# Patient Record
Sex: FEMALE | Race: WHITE | NOT HISPANIC OR LATINO
[De-identification: names, ages, dates, MRNs, and addresses within clinical notes are randomized per-mention and may not be internally consistent; named-entity substitution may affect disease eponyms.]

---

## 2023-11-10 ENCOUNTER — NON-APPOINTMENT (OUTPATIENT)
Age: 18
End: 2023-11-10

## 2023-12-20 ENCOUNTER — EMERGENCY (EMERGENCY)
Facility: HOSPITAL | Age: 18
LOS: 0 days | Discharge: HOME | End: 2023-12-20
Attending: EMERGENCY MEDICINE
Payer: COMMERCIAL

## 2023-12-20 VITALS
RESPIRATION RATE: 18 BRPM | TEMPERATURE: 98 F | HEART RATE: 105 BPM | DIASTOLIC BLOOD PRESSURE: 70 MMHG | OXYGEN SATURATION: 100 % | SYSTOLIC BLOOD PRESSURE: 135 MMHG | HEIGHT: 63 IN | WEIGHT: 115.96 LBS

## 2023-12-20 VITALS
HEART RATE: 89 BPM | OXYGEN SATURATION: 99 % | SYSTOLIC BLOOD PRESSURE: 114 MMHG | TEMPERATURE: 98 F | DIASTOLIC BLOOD PRESSURE: 70 MMHG | RESPIRATION RATE: 16 BRPM

## 2023-12-20 DIAGNOSIS — B34.9 VIRAL INFECTION, UNSPECIFIED: ICD-10-CM

## 2023-12-20 DIAGNOSIS — R00.0 TACHYCARDIA, UNSPECIFIED: ICD-10-CM

## 2023-12-20 DIAGNOSIS — R53.83 OTHER FATIGUE: ICD-10-CM

## 2023-12-20 DIAGNOSIS — R53.1 WEAKNESS: ICD-10-CM

## 2023-12-20 DIAGNOSIS — E86.0 DEHYDRATION: ICD-10-CM

## 2023-12-20 DIAGNOSIS — R52 PAIN, UNSPECIFIED: ICD-10-CM

## 2023-12-20 DIAGNOSIS — Z20.822 CONTACT WITH AND (SUSPECTED) EXPOSURE TO COVID-19: ICD-10-CM

## 2023-12-20 LAB
ALBUMIN SERPL ELPH-MCNC: 3.4 G/DL — SIGNIFICANT CHANGE UP (ref 3.3–5)
ALBUMIN SERPL ELPH-MCNC: 3.4 G/DL — SIGNIFICANT CHANGE UP (ref 3.3–5)
ALP SERPL-CCNC: 45 U/L — SIGNIFICANT CHANGE UP (ref 40–120)
ALP SERPL-CCNC: 45 U/L — SIGNIFICANT CHANGE UP (ref 40–120)
ALT FLD-CCNC: 15 U/L — SIGNIFICANT CHANGE UP (ref 12–78)
ALT FLD-CCNC: 15 U/L — SIGNIFICANT CHANGE UP (ref 12–78)
ANION GAP SERPL CALC-SCNC: 4 MMOL/L — LOW (ref 5–17)
ANION GAP SERPL CALC-SCNC: 4 MMOL/L — LOW (ref 5–17)
APPEARANCE UR: CLEAR — SIGNIFICANT CHANGE UP
APPEARANCE UR: CLEAR — SIGNIFICANT CHANGE UP
AST SERPL-CCNC: 11 U/L — LOW (ref 15–37)
AST SERPL-CCNC: 11 U/L — LOW (ref 15–37)
BASOPHILS # BLD AUTO: 0.08 K/UL — SIGNIFICANT CHANGE UP (ref 0–0.2)
BASOPHILS # BLD AUTO: 0.08 K/UL — SIGNIFICANT CHANGE UP (ref 0–0.2)
BASOPHILS NFR BLD AUTO: 1 % — SIGNIFICANT CHANGE UP (ref 0–2)
BASOPHILS NFR BLD AUTO: 1 % — SIGNIFICANT CHANGE UP (ref 0–2)
BILIRUB SERPL-MCNC: 0.6 MG/DL — SIGNIFICANT CHANGE UP (ref 0.2–1.2)
BILIRUB SERPL-MCNC: 0.6 MG/DL — SIGNIFICANT CHANGE UP (ref 0.2–1.2)
BILIRUB UR-MCNC: NEGATIVE — SIGNIFICANT CHANGE UP
BILIRUB UR-MCNC: NEGATIVE — SIGNIFICANT CHANGE UP
BUN SERPL-MCNC: 6 MG/DL — LOW (ref 7–23)
BUN SERPL-MCNC: 6 MG/DL — LOW (ref 7–23)
CALCIUM SERPL-MCNC: 8.6 MG/DL — SIGNIFICANT CHANGE UP (ref 8.5–10.1)
CALCIUM SERPL-MCNC: 8.6 MG/DL — SIGNIFICANT CHANGE UP (ref 8.5–10.1)
CHLORIDE SERPL-SCNC: 109 MMOL/L — HIGH (ref 96–108)
CHLORIDE SERPL-SCNC: 109 MMOL/L — HIGH (ref 96–108)
CK SERPL-CCNC: 25 U/L — LOW (ref 26–192)
CK SERPL-CCNC: 25 U/L — LOW (ref 26–192)
CO2 SERPL-SCNC: 28 MMOL/L — SIGNIFICANT CHANGE UP (ref 22–31)
CO2 SERPL-SCNC: 28 MMOL/L — SIGNIFICANT CHANGE UP (ref 22–31)
COLOR SPEC: YELLOW — SIGNIFICANT CHANGE UP
COLOR SPEC: YELLOW — SIGNIFICANT CHANGE UP
CREAT SERPL-MCNC: 0.64 MG/DL — SIGNIFICANT CHANGE UP (ref 0.5–1.3)
CREAT SERPL-MCNC: 0.64 MG/DL — SIGNIFICANT CHANGE UP (ref 0.5–1.3)
DIFF PNL FLD: NEGATIVE — SIGNIFICANT CHANGE UP
DIFF PNL FLD: NEGATIVE — SIGNIFICANT CHANGE UP
EGFR: 130 ML/MIN/1.73M2 — SIGNIFICANT CHANGE UP
EGFR: 130 ML/MIN/1.73M2 — SIGNIFICANT CHANGE UP
EOSINOPHIL # BLD AUTO: 0.26 K/UL — SIGNIFICANT CHANGE UP (ref 0–0.5)
EOSINOPHIL # BLD AUTO: 0.26 K/UL — SIGNIFICANT CHANGE UP (ref 0–0.5)
EOSINOPHIL NFR BLD AUTO: 3.2 % — SIGNIFICANT CHANGE UP (ref 0–6)
EOSINOPHIL NFR BLD AUTO: 3.2 % — SIGNIFICANT CHANGE UP (ref 0–6)
FLUAV AG NPH QL: SIGNIFICANT CHANGE UP
FLUAV AG NPH QL: SIGNIFICANT CHANGE UP
FLUBV AG NPH QL: SIGNIFICANT CHANGE UP
FLUBV AG NPH QL: SIGNIFICANT CHANGE UP
GLUCOSE SERPL-MCNC: 96 MG/DL — SIGNIFICANT CHANGE UP (ref 70–99)
GLUCOSE SERPL-MCNC: 96 MG/DL — SIGNIFICANT CHANGE UP (ref 70–99)
GLUCOSE UR QL: NEGATIVE MG/DL — SIGNIFICANT CHANGE UP
GLUCOSE UR QL: NEGATIVE MG/DL — SIGNIFICANT CHANGE UP
HCG SERPL-ACNC: <1 MIU/ML — SIGNIFICANT CHANGE UP
HCG SERPL-ACNC: <1 MIU/ML — SIGNIFICANT CHANGE UP
HCT VFR BLD CALC: 38.2 % — SIGNIFICANT CHANGE UP (ref 34.5–45)
HCT VFR BLD CALC: 38.2 % — SIGNIFICANT CHANGE UP (ref 34.5–45)
HGB BLD-MCNC: 12.8 G/DL — SIGNIFICANT CHANGE UP (ref 11.5–15.5)
HGB BLD-MCNC: 12.8 G/DL — SIGNIFICANT CHANGE UP (ref 11.5–15.5)
IMM GRANULOCYTES NFR BLD AUTO: 0.2 % — SIGNIFICANT CHANGE UP (ref 0–0.9)
IMM GRANULOCYTES NFR BLD AUTO: 0.2 % — SIGNIFICANT CHANGE UP (ref 0–0.9)
KETONES UR-MCNC: NEGATIVE MG/DL — SIGNIFICANT CHANGE UP
KETONES UR-MCNC: NEGATIVE MG/DL — SIGNIFICANT CHANGE UP
LEUKOCYTE ESTERASE UR-ACNC: NEGATIVE — SIGNIFICANT CHANGE UP
LEUKOCYTE ESTERASE UR-ACNC: NEGATIVE — SIGNIFICANT CHANGE UP
LYMPHOCYTES # BLD AUTO: 0.79 K/UL — LOW (ref 1–3.3)
LYMPHOCYTES # BLD AUTO: 0.79 K/UL — LOW (ref 1–3.3)
LYMPHOCYTES # BLD AUTO: 9.8 % — LOW (ref 13–44)
LYMPHOCYTES # BLD AUTO: 9.8 % — LOW (ref 13–44)
MAGNESIUM SERPL-MCNC: 2 MG/DL — SIGNIFICANT CHANGE UP (ref 1.6–2.6)
MAGNESIUM SERPL-MCNC: 2 MG/DL — SIGNIFICANT CHANGE UP (ref 1.6–2.6)
MCHC RBC-ENTMCNC: 30.7 PG — SIGNIFICANT CHANGE UP (ref 27–34)
MCHC RBC-ENTMCNC: 30.7 PG — SIGNIFICANT CHANGE UP (ref 27–34)
MCHC RBC-ENTMCNC: 33.5 GM/DL — SIGNIFICANT CHANGE UP (ref 32–36)
MCHC RBC-ENTMCNC: 33.5 GM/DL — SIGNIFICANT CHANGE UP (ref 32–36)
MCV RBC AUTO: 91.6 FL — SIGNIFICANT CHANGE UP (ref 80–100)
MCV RBC AUTO: 91.6 FL — SIGNIFICANT CHANGE UP (ref 80–100)
MONOCYTES # BLD AUTO: 0.57 K/UL — SIGNIFICANT CHANGE UP (ref 0–0.9)
MONOCYTES # BLD AUTO: 0.57 K/UL — SIGNIFICANT CHANGE UP (ref 0–0.9)
MONOCYTES NFR BLD AUTO: 7.1 % — SIGNIFICANT CHANGE UP (ref 2–14)
MONOCYTES NFR BLD AUTO: 7.1 % — SIGNIFICANT CHANGE UP (ref 2–14)
NEUTROPHILS # BLD AUTO: 6.34 K/UL — SIGNIFICANT CHANGE UP (ref 1.8–7.4)
NEUTROPHILS # BLD AUTO: 6.34 K/UL — SIGNIFICANT CHANGE UP (ref 1.8–7.4)
NEUTROPHILS NFR BLD AUTO: 78.7 % — HIGH (ref 43–77)
NEUTROPHILS NFR BLD AUTO: 78.7 % — HIGH (ref 43–77)
NITRITE UR-MCNC: NEGATIVE — SIGNIFICANT CHANGE UP
NITRITE UR-MCNC: NEGATIVE — SIGNIFICANT CHANGE UP
PH UR: 7 — SIGNIFICANT CHANGE UP (ref 5–8)
PH UR: 7 — SIGNIFICANT CHANGE UP (ref 5–8)
PLATELET # BLD AUTO: 363 K/UL — SIGNIFICANT CHANGE UP (ref 150–400)
PLATELET # BLD AUTO: 363 K/UL — SIGNIFICANT CHANGE UP (ref 150–400)
POTASSIUM SERPL-MCNC: 3.8 MMOL/L — SIGNIFICANT CHANGE UP (ref 3.5–5.3)
POTASSIUM SERPL-MCNC: 3.8 MMOL/L — SIGNIFICANT CHANGE UP (ref 3.5–5.3)
POTASSIUM SERPL-SCNC: 3.8 MMOL/L — SIGNIFICANT CHANGE UP (ref 3.5–5.3)
POTASSIUM SERPL-SCNC: 3.8 MMOL/L — SIGNIFICANT CHANGE UP (ref 3.5–5.3)
PROT SERPL-MCNC: 7.2 GM/DL — SIGNIFICANT CHANGE UP (ref 6–8.3)
PROT SERPL-MCNC: 7.2 GM/DL — SIGNIFICANT CHANGE UP (ref 6–8.3)
PROT UR-MCNC: NEGATIVE MG/DL — SIGNIFICANT CHANGE UP
PROT UR-MCNC: NEGATIVE MG/DL — SIGNIFICANT CHANGE UP
RBC # BLD: 4.17 M/UL — SIGNIFICANT CHANGE UP (ref 3.8–5.2)
RBC # BLD: 4.17 M/UL — SIGNIFICANT CHANGE UP (ref 3.8–5.2)
RBC # FLD: 11.8 % — SIGNIFICANT CHANGE UP (ref 10.3–14.5)
RBC # FLD: 11.8 % — SIGNIFICANT CHANGE UP (ref 10.3–14.5)
RSV RNA NPH QL NAA+NON-PROBE: SIGNIFICANT CHANGE UP
RSV RNA NPH QL NAA+NON-PROBE: SIGNIFICANT CHANGE UP
SARS-COV-2 RNA SPEC QL NAA+PROBE: SIGNIFICANT CHANGE UP
SARS-COV-2 RNA SPEC QL NAA+PROBE: SIGNIFICANT CHANGE UP
SODIUM SERPL-SCNC: 141 MMOL/L — SIGNIFICANT CHANGE UP (ref 135–145)
SODIUM SERPL-SCNC: 141 MMOL/L — SIGNIFICANT CHANGE UP (ref 135–145)
SP GR SPEC: 1.01 — SIGNIFICANT CHANGE UP (ref 1–1.03)
SP GR SPEC: 1.01 — SIGNIFICANT CHANGE UP (ref 1–1.03)
TROPONIN I, HIGH SENSITIVITY RESULT: 5.59 NG/L — SIGNIFICANT CHANGE UP
TROPONIN I, HIGH SENSITIVITY RESULT: 5.59 NG/L — SIGNIFICANT CHANGE UP
UROBILINOGEN FLD QL: 0.2 MG/DL — SIGNIFICANT CHANGE UP (ref 0.2–1)
UROBILINOGEN FLD QL: 0.2 MG/DL — SIGNIFICANT CHANGE UP (ref 0.2–1)
WBC # BLD: 8.06 K/UL — SIGNIFICANT CHANGE UP (ref 3.8–10.5)
WBC # BLD: 8.06 K/UL — SIGNIFICANT CHANGE UP (ref 3.8–10.5)
WBC # FLD AUTO: 8.06 K/UL — SIGNIFICANT CHANGE UP (ref 3.8–10.5)
WBC # FLD AUTO: 8.06 K/UL — SIGNIFICANT CHANGE UP (ref 3.8–10.5)

## 2023-12-20 PROCEDURE — 85025 COMPLETE CBC W/AUTO DIFF WBC: CPT

## 2023-12-20 PROCEDURE — 0241U: CPT

## 2023-12-20 PROCEDURE — 99285 EMERGENCY DEPT VISIT HI MDM: CPT

## 2023-12-20 PROCEDURE — 83735 ASSAY OF MAGNESIUM: CPT

## 2023-12-20 PROCEDURE — 87086 URINE CULTURE/COLONY COUNT: CPT

## 2023-12-20 PROCEDURE — 82550 ASSAY OF CK (CPK): CPT

## 2023-12-20 PROCEDURE — 84484 ASSAY OF TROPONIN QUANT: CPT

## 2023-12-20 PROCEDURE — 36415 COLL VENOUS BLD VENIPUNCTURE: CPT

## 2023-12-20 PROCEDURE — 84702 CHORIONIC GONADOTROPIN TEST: CPT

## 2023-12-20 PROCEDURE — 93005 ELECTROCARDIOGRAM TRACING: CPT

## 2023-12-20 PROCEDURE — 80053 COMPREHEN METABOLIC PANEL: CPT

## 2023-12-20 PROCEDURE — 81003 URINALYSIS AUTO W/O SCOPE: CPT

## 2023-12-20 PROCEDURE — 93010 ELECTROCARDIOGRAM REPORT: CPT

## 2023-12-20 PROCEDURE — 99284 EMERGENCY DEPT VISIT MOD MDM: CPT

## 2023-12-20 RX ORDER — SODIUM CHLORIDE 9 MG/ML
1000 INJECTION INTRAMUSCULAR; INTRAVENOUS; SUBCUTANEOUS ONCE
Refills: 0 | Status: COMPLETED | OUTPATIENT
Start: 2023-12-20 | End: 2023-12-20

## 2023-12-20 RX ORDER — ACETAMINOPHEN 500 MG
650 TABLET ORAL ONCE
Refills: 0 | Status: COMPLETED | OUTPATIENT
Start: 2023-12-20 | End: 2023-12-20

## 2023-12-20 RX ADMIN — SODIUM CHLORIDE 1000 MILLILITER(S): 9 INJECTION INTRAMUSCULAR; INTRAVENOUS; SUBCUTANEOUS at 10:55

## 2023-12-20 RX ADMIN — Medication 650 MILLIGRAM(S): at 11:42

## 2023-12-20 NOTE — ED PROVIDER NOTE - OBJECTIVE STATEMENT
19 yo female w/no pertinent PMHx presents to the ED c/o muscle weakness, fatigue, and body aches onset 1 week ago. States she needs to work hard to lift her L arm due to pain and weakness. No fever, no SOB, no URI symptoms, no diarrhea, no loss of appetite. Has sore throat prior to other symptoms but has since subsided. Pt was due to see Dr. Hein this afternoon. No allergies. LMP November 28th. Parents report last week pt had two episodes of syncope. Mom reporting a family history of vasovagal syncope. 17 yo female w/no pertinent PMHx presents to the ED c/o muscle weakness, fatigue, and body aches onset 1 week ago. States she needs to work hard to lift her L arm due to pain and weakness. No fever, no SOB, no URI symptoms, no diarrhea, no loss of appetite. Has sore throat prior to other symptoms but has since subsided. Pt was due to see Dr. Hein this afternoon. No allergies. LMP November 28th. Parents report last week pt had two episodes of syncope. Mom reporting a family history of vasovagal syncope. 17 yo female w/no pertinent PMHx BIB parents ambulatory to the ED c/o muscle weakness, fatigue/general weakness, and diffuse body aches X 1 week. States she needs to give some extra effort to lift her L arm due to myalgia and weakness. No fever, no SOB, no URI symptoms, no diarrhea, no loss of appetite. Has sore throat prior to other symptoms but has since self-resolved. Pt was due to see PCP Dr. Hein this afternoon. No allergies. LMP November 28th. Parents report last week pt had two episodes of syncope. Mom reporting a family history of vasovagal syncope. 19 yo female w/no pertinent PMHx BIB parents ambulatory to the ED c/o muscle weakness, fatigue/general weakness, and diffuse body aches X 1 week. States she needs to give some extra effort to lift her L arm due to myalgia and weakness. No fever, no SOB, no URI symptoms, no diarrhea, no loss of appetite. Has sore throat prior to other symptoms but has since self-resolved. Pt was due to see PCP Dr. Hein this afternoon. No allergies. LMP November 28th. Parents report last week pt had two episodes of syncope. Mom reporting a family history of vasovagal syncope.

## 2023-12-20 NOTE — ED ADULT NURSE NOTE - TEMPLATE
Pharmacy is out of stock- (Methylphenidate)   In stock- (Metadate CD 20 mg)  Pharmacy loaded  Father:   NDC: [address]-05   General

## 2023-12-20 NOTE — ED ADULT NURSE NOTE - NSFALLRISKINTERV_ED_ALL_ED
Communicate fall risk and risk factors to all staff, patient, and family/Provide visual cue: yellow wristband, yellow gown, etc/Reinforce activity limits and safety measures with patient and family/Call bell, personal items and telephone in reach/Instruct patient to call for assistance before getting out of bed/chair/stretcher/Non-slip footwear applied when patient is off stretcher/[place] to call system/Physically safe environment - no spills, clutter or unnecessary equipment/Purposeful Proactive Rounding/Room/bathroom lighting operational, light cord in reach Communicate fall risk and risk factors to all staff, patient, and family/Provide visual cue: yellow wristband, yellow gown, etc/Reinforce activity limits and safety measures with patient and family/Call bell, personal items and telephone in reach/Instruct patient to call for assistance before getting out of bed/chair/stretcher/Non-slip footwear applied when patient is off stretcher/[place] to call system/Physically safe environment - no spills, clutter or unnecessary equipment/Purposeful Proactive Rounding/Room/bathroom lighting operational, light cord in reach

## 2023-12-20 NOTE — ED PROVIDER NOTE - PATIENT PORTAL LINK FT
You can access the FollowMyHealth Patient Portal offered by [facility] by registering at the following website: [email or web address]. By joining [employer]’s FollowMyHealth portal, you will also be able to view your health information using other applications (apps) compatible with our system. You can access the FollowMyHealth Patient Portal offered by [facility] by registering at the following website: [email or web address]. By joining [employer]’s FollowMyHealth portal, you will also be able to view your health information using other applications (apps) compatible with our system.

## 2023-12-20 NOTE — ED ADULT NURSE NOTE - OBJECTIVE STATEMENT
The pt is a 19 y/o female presenting to the ED c/o weakness since Wednesday december 13th. Pt states that on Wednesday she passed out and wet herself and that her boyfriend was with her when this happened. Pt reports that she has been having body aches since Wednesday. Pt has no known medical or surgical history. Pt denies SOB, chest pain, fever, chills, N/V/D. Pt presents calm and cooperative. Mother and father at bedside. Mother states that she gave her 7.5 of Meloxicam at 1 am and .25mg Xanax at 3 am to calm her down due to her unable to sleep. Pt mother also states that there is a family history of vasovagal. Pt does report that Tylenol helps with body aches. The pt is a 17 y/o female presenting to the ED c/o weakness since Wednesday december 13th. Pt states that on Wednesday she passed out and wet herself and that her boyfriend was with her when this happened. Pt reports that she has been having body aches since Wednesday. Pt has no known medical or surgical history. Pt denies SOB, chest pain, fever, chills, N/V/D. Pt presents calm and cooperative. Mother and father at bedside. Mother states that she gave her 7.5 of Meloxicam at 1 am and .25mg Xanax at 3 am to calm her down due to her unable to sleep. Pt mother also states that there is a family history of vasovagal. Pt does report that Tylenol helps with body aches.

## 2023-12-20 NOTE — ED PROVIDER NOTE - NSFOLLOWUPINSTRUCTIONS_ED_ALL_ED_FT
Rest. Drink plenty of oral fluids.  Follow up own doctor.  Presumed diagnosis: viral syndrome.  Tylenol or Advil 2 tabs every 6 hours as needed for aches & pains.

## 2023-12-20 NOTE — ED PROVIDER NOTE - CLINICAL SUMMARY MEDICAL DECISION MAKING FREE TEXT BOX
17 yo female no PMHx probable covid infection late august early september c/o 1 week generalized weakness fatigue malaise, +syncope x2 1 week ago witnessed no seizure activity no recurrence. Symptoms preceded by sore throat self resolved. Pt mildly tachycardic mildly dehydrated, exam otherwise benign. Plan Flu/covid swab, labs including preg test troponin magnesium, UA, IV fluids, monitor observe and reassess. Expect d/c for out pt f/u. 19 yo female no PMHx probable covid infection late august early september c/o 1 week generalized weakness fatigue malaise, +syncope x2 1 week ago witnessed no seizure activity no recurrence. Symptoms preceded by sore throat self resolved. Pt mildly tachycardic mildly dehydrated, exam otherwise benign. Plan Flu/covid swab, labs including preg test troponin magnesium, UA, IV fluids, monitor observe and reassess. Expect d/c for out pt f/u. 17 yo female no PMHx probable covid infection late august early september c/o 1 week generalized weakness fatigue malaise, +syncope x2 1 week ago witnessed no seizure activity no recurrence. Symptoms preceded by sore throat self resolved. Pt mildly tachycardic mildly dehydrated, exam otherwise benign. Plan Flu/covid swab, labs including preg test troponin magnesium, UA, IV fluids, monitor observe and reassess. Expect d/c for out pt f/u.    12:40, NIGEL Contreras MD:  Labs unremarkable incl. Flu/COVID negative.  Pt stable for DC, outpt f/u. 19 yo female no PMHx, probable covid infection late august/early september, c/o 1 week generalized weakness/fatigue, malaise, +syncope x2 1 week ago witnessed, no seizure activity, no recurrence. Symptoms preceded by sore throat self-resolved. Pt mildly tachycardic, mildly dehydrated, exam otherwise benign. Plan: Flu/covid swab, labs including preg test, troponin, magnesium, U/A, IV fluids, monitor observe and reassess. Expect d/c for outpt f/u.    12:40, NIGEL Contreras MD:  Labs unremarkable incl. Flu/COVID negative.  Pt stable for DC, outpt f/u. 17 yo female no PMHx, probable covid infection late august/early september, c/o 1 week generalized weakness/fatigue, malaise, +syncope x2 1 week ago witnessed, no seizure activity, no recurrence. Symptoms preceded by sore throat self-resolved. Pt mildly tachycardic, mildly dehydrated, exam otherwise benign. Plan: Flu/covid swab, labs including preg test, troponin, magnesium, U/A, IV fluids, monitor observe and reassess. Expect d/c for outpt f/u.    12:40, NIGEL Contreras MD:  Labs unremarkable incl. Flu/COVID negative.  Pt stable for DC, outpt f/u.

## 2023-12-20 NOTE — ED PROVIDER NOTE - PHYSICAL EXAMINATION
General:  Thin young white female, alert, no respiratory distress, non toxic  Head: NC/AT  Eyes: PERRL EOMI, no nystagmus, no photophobia  Mouth: oropharynx clear, mucous membranes mildly dry  Heart: mildly tachycardic, regular rhythm, normal radial pulse  Lungs: clear, normal respirations  Gu: deferred no flank/CVA tenderness  Abdomen: soft non tender, bowel sounds positive  Musculoskeletal: no focal swelling or tenderness, LOPEZ x4, soft tissue soft, b/l large joints non tender no effusion AFROM without pain  Neck: supple non tender, no meningismus, no cervical lymphadenopathy, AFROM  Skin: no tactile warmth no rash  Neuro: a&o x3, cn 2-12 intact, no focal sensory or motor deficits including upper extremities distal and proximal testing General:  Thin young white female, alert, no respiratory distress, nontoxic  Head: NC/AT  Eyes: PERRL EOMI, no nystagmus, no photophobia  Mouth: oropharynx clear, mucous membranes mildly dry  Heart: mildly tachycardic, regular rhythm, normal radial pulse  Lungs: clear, normal respirations  Gu: deferred no flank/CVA tenderness  Abdomen: soft nontender, bowel sounds positive  Musculoskeletal: no focal extremity swelling or tenderness, LOPEZ x4, soft tissues soft, b/l large joints nontender, no effusion, AFROM without pain  Neck: supple, nontender, no meningismus, no cervical lymphadenopathy, +AFROM  Skin: no tactile warmth nor rash  Neuro: a&o x3, CN 2-12 intact, no focal sensory or motor deficits including upper extremities distal and proximal testing

## 2023-12-22 LAB
CULTURE RESULTS: SIGNIFICANT CHANGE UP
CULTURE RESULTS: SIGNIFICANT CHANGE UP
SPECIMEN SOURCE: SIGNIFICANT CHANGE UP
SPECIMEN SOURCE: SIGNIFICANT CHANGE UP

## 2024-01-10 PROBLEM — Z00.00 ENCOUNTER FOR PREVENTIVE HEALTH EXAMINATION: Status: ACTIVE | Noted: 2024-01-10

## 2024-01-11 ENCOUNTER — NON-APPOINTMENT (OUTPATIENT)
Age: 19
End: 2024-01-11

## 2024-01-11 ENCOUNTER — APPOINTMENT (OUTPATIENT)
Dept: OTOLARYNGOLOGY | Facility: CLINIC | Age: 19
End: 2024-01-11
Payer: COMMERCIAL

## 2024-01-11 VITALS
WEIGHT: 116 LBS | BODY MASS INDEX: 19.81 KG/M2 | HEIGHT: 64 IN | HEART RATE: 74 BPM | SYSTOLIC BLOOD PRESSURE: 131 MMHG | DIASTOLIC BLOOD PRESSURE: 70 MMHG

## 2024-01-11 DIAGNOSIS — Z82.49 FAMILY HISTORY OF ISCHEMIC HEART DISEASE AND OTHER DISEASES OF THE CIRCULATORY SYSTEM: ICD-10-CM

## 2024-01-11 DIAGNOSIS — J03.91 ACUTE RECURRENT TONSILLITIS, UNSPECIFIED: ICD-10-CM

## 2024-01-11 DIAGNOSIS — Z83.3 FAMILY HISTORY OF DIABETES MELLITUS: ICD-10-CM

## 2024-01-11 DIAGNOSIS — Z78.9 OTHER SPECIFIED HEALTH STATUS: ICD-10-CM

## 2024-01-11 DIAGNOSIS — F17.210 NICOTINE DEPENDENCE, CIGARETTES, UNCOMPLICATED: ICD-10-CM

## 2024-01-11 PROCEDURE — 99203 OFFICE O/P NEW LOW 30 MIN: CPT

## 2024-01-11 RX ORDER — NORETHINDRONE ACETATE AND ETHINYL ESTRADIOL .5; .0025 MG/1; MG/1
TABLET ORAL
Refills: 0 | Status: ACTIVE | COMMUNITY

## 2024-01-11 NOTE — REVIEW OF SYSTEMS
[Throat Clearing] : throat clearing [Throat Pain] : throat pain [Throat Dryness] : throat dryness [Throat Itching] : throat itching [Cough] : cough [Joint Pain] : joint pain [Swollen Glands] : swollen glands [Negative] : Endocrine

## 2024-01-11 NOTE — PHYSICAL EXAM
[de-identified] : shotty bilat level 2 cervical adenopathy [Normal] : mucosa is normal [Midline] : trachea located in midline position [de-identified] : 3+

## 2024-01-11 NOTE — HISTORY OF PRESENT ILLNESS
[de-identified] : 18 yr old female w recurrent sore throats w hx of strep as a child 3x since the Fall  Oct 2023 conjunctivitis and sore throat.  Covid, flu and RSV all - Nov 2023 had sinusitis and sore throat, worse on the right Dec 2023 passed out at school w achy joints and muscles. flu  and Covid - in the ER. Dx viral syndrome. Mom saw red swollen tonsil rx Jerome, then amoxil and prednisone.  Labwork +EBV at some time now has a 30d halter monitor